# Patient Record
Sex: MALE | Race: WHITE | NOT HISPANIC OR LATINO | Employment: FULL TIME | URBAN - METROPOLITAN AREA
[De-identification: names, ages, dates, MRNs, and addresses within clinical notes are randomized per-mention and may not be internally consistent; named-entity substitution may affect disease eponyms.]

---

## 2017-01-23 ENCOUNTER — ALLSCRIPTS OFFICE VISIT (OUTPATIENT)
Dept: OTHER | Facility: OTHER | Age: 47
End: 2017-01-23

## 2017-01-24 LAB
BUN SERPL-MCNC: 22 MG/DL (ref 6–24)
BUN/CREA RATIO (HISTORICAL): 17 (ref 9–20)
CALCIUM SERPL-MCNC: 9.8 MG/DL (ref 8.7–10.2)
CHLORIDE SERPL-SCNC: 105 MMOL/L (ref 96–106)
CO2 SERPL-SCNC: 24 MMOL/L (ref 18–29)
CREAT SERPL-MCNC: 1.27 MG/DL (ref 0.76–1.27)
EGFR AFRICAN AMERICAN (HISTORICAL): 77 ML/MIN/1.73
EGFR-AMERICAN CALC (HISTORICAL): 66 ML/MIN/1.73
GLUCOSE SERPL-MCNC: 96 MG/DL (ref 65–99)
POTASSIUM SERPL-SCNC: 4.2 MMOL/L (ref 3.5–5.2)
SODIUM SERPL-SCNC: 146 MMOL/L (ref 134–144)
SODIUM URINE (HISTORICAL): 71 MMOL/L

## 2017-01-25 LAB
OSMOLALITY UR: 480 MOSMOL/KG
OSMOLALITY, SERUM (HISTORICAL): 303 MOSMOL/KG (ref 275–295)

## 2017-02-03 DIAGNOSIS — R20.0 ANESTHESIA OF SKIN: ICD-10-CM

## 2017-02-03 DIAGNOSIS — R29.898 OTHER SYMPTOMS AND SIGNS INVOLVING THE MUSCULOSKELETAL SYSTEM: ICD-10-CM

## 2017-02-14 ENCOUNTER — HOSPITAL ENCOUNTER (OUTPATIENT)
Dept: MRI IMAGING | Facility: HOSPITAL | Age: 47
Discharge: HOME | End: 2017-02-14
Payer: COMMERCIAL

## 2017-02-14 DIAGNOSIS — R20.0 ANESTHESIA OF SKIN: ICD-10-CM

## 2017-02-14 DIAGNOSIS — R29.898 OTHER SYMPTOMS AND SIGNS INVOLVING THE MUSCULOSKELETAL SYSTEM: ICD-10-CM

## 2017-02-14 PROCEDURE — A9585 GADOBUTROL INJECTION: HCPCS | Performed by: RADIOLOGY

## 2017-02-14 PROCEDURE — 70553 MRI BRAIN STEM W/O & W/DYE: CPT

## 2017-02-14 RX ADMIN — GADOBUTROL 10 ML: 604.72 INJECTION INTRAVENOUS at 21:08

## 2017-02-16 ENCOUNTER — GENERIC CONVERSION - ENCOUNTER (OUTPATIENT)
Dept: OTHER | Facility: OTHER | Age: 47
End: 2017-02-16

## 2017-03-06 ENCOUNTER — GENERIC CONVERSION - ENCOUNTER (OUTPATIENT)
Dept: OTHER | Facility: OTHER | Age: 47
End: 2017-03-06

## 2017-03-06 ENCOUNTER — TRANSCRIBE ORDERS (OUTPATIENT)
Dept: ADMINISTRATIVE | Facility: HOSPITAL | Age: 47
End: 2017-03-06

## 2017-03-06 ENCOUNTER — ALLSCRIPTS OFFICE VISIT (OUTPATIENT)
Dept: OTHER | Facility: OTHER | Age: 47
End: 2017-03-06

## 2017-03-06 DIAGNOSIS — M47.22 CERVICAL SPONDYLOSIS WITH RADICULOPATHY: Primary | ICD-10-CM

## 2017-03-10 DIAGNOSIS — M47.22 OTHER SPONDYLOSIS WITH RADICULOPATHY, CERVICAL REGION: ICD-10-CM

## 2017-03-25 ENCOUNTER — HOSPITAL ENCOUNTER (OUTPATIENT)
Dept: MRI IMAGING | Facility: HOSPITAL | Age: 47
Discharge: HOME | End: 2017-03-25
Attending: PSYCHIATRY & NEUROLOGY
Payer: COMMERCIAL

## 2017-03-25 DIAGNOSIS — M47.22 OTHER SPONDYLOSIS WITH RADICULOPATHY, CERVICAL REGION: ICD-10-CM

## 2017-03-25 PROCEDURE — 72141 MRI NECK SPINE W/O DYE: CPT

## 2017-03-27 ENCOUNTER — HOSPITAL ENCOUNTER (OUTPATIENT)
Dept: NEUROLOGY | Facility: CLINIC | Age: 47
Discharge: HOME | End: 2017-03-27
Payer: COMMERCIAL

## 2017-03-27 ENCOUNTER — GENERIC CONVERSION - ENCOUNTER (OUTPATIENT)
Dept: OTHER | Facility: OTHER | Age: 47
End: 2017-03-27

## 2017-03-27 DIAGNOSIS — M47.22 CERVICAL SPONDYLOSIS WITH RADICULOPATHY: ICD-10-CM

## 2017-03-27 DIAGNOSIS — R20.2 PARESTHESIA: ICD-10-CM

## 2017-03-27 PROCEDURE — 95913 NRV CNDJ TEST 13/> STUDIES: CPT

## 2017-03-27 PROCEDURE — 95886 MUSC TEST DONE W/N TEST COMP: CPT

## 2017-03-30 ENCOUNTER — ALLSCRIPTS OFFICE VISIT (OUTPATIENT)
Dept: OTHER | Facility: OTHER | Age: 47
End: 2017-03-30

## 2017-03-30 LAB
BILIRUB UR QL STRIP: NORMAL
CLARITY UR: NORMAL
COLOR UR: YELLOW
GLUCOSE (HISTORICAL): NORMAL
HGB UR QL STRIP.AUTO: NORMAL
KETONES UR STRIP-MCNC: NORMAL MG/DL
LEUKOCYTE ESTERASE UR QL STRIP: NORMAL
NITRITE UR QL STRIP: NORMAL
PH UR STRIP.AUTO: 6.5 [PH]
PROT UR STRIP-MCNC: NORMAL MG/DL
SP GR UR STRIP.AUTO: 1.02
UROBILINOGEN UR QL STRIP.AUTO: 0.2

## 2017-04-17 ENCOUNTER — ALLSCRIPTS OFFICE VISIT (OUTPATIENT)
Dept: OTHER | Facility: OTHER | Age: 47
End: 2017-04-17

## 2017-05-23 ENCOUNTER — GENERIC CONVERSION - ENCOUNTER (OUTPATIENT)
Dept: OTHER | Facility: OTHER | Age: 47
End: 2017-05-23

## 2018-01-10 NOTE — RESULT NOTES
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, limited mobility     COMPARISON: Left shoulder pain     VIEWS: 4; 4 images     FINDINGS:     There is no acute fracture or dislocation  There is narrowing of the acromioclavicular joint consistent with degenerative change  Humeral head is mildly high riding which may indicate chronic rotator cuff injury  No lytic or blastic lesions are seen  Soft tissues are unremarkable  IMPRESSION:     No acute osseous abnormality  Workstation performed: [id number]     Signed by:   Susan Sandoval MD   3/25/16       Plan  Chronic left shoulder pain, Injury of left shoulder, initial encounter    · * MRI SHOULDER LEFT WO CONTRAST; Status:Need Information - Financial  Authorization;  Requested for:25Mar2016;

## 2018-01-12 VITALS
OXYGEN SATURATION: 98 % | RESPIRATION RATE: 18 BRPM | SYSTOLIC BLOOD PRESSURE: 126 MMHG | BODY MASS INDEX: 33.84 KG/M2 | DIASTOLIC BLOOD PRESSURE: 78 MMHG | HEART RATE: 77 BPM | HEIGHT: 69 IN | WEIGHT: 228.5 LBS

## 2018-01-12 NOTE — RESULT NOTES
Verified Results  * MRI SHOULDER LEFT WO CONTRAST [id number] 06:23AM [facility] Order Number: [id number]     Test Name Result Flag Reference   MRI SHOULDER LEFT WO CONTRAST (Report)     MRI LEFT SHOULDER     INDICATION: Recent fall, left shoulder pain with decreased range of motion     COMPARISON: Left shoulder radiograph 3/24/2016     TECHNIQUE:  The following MR sequences were obtained of the left shoulder: Localizer, axial GRE/PD fat sat, oblique coronal T2 fat sat, oblique sagittal T1/T2 fat sat  Images were acquired on a 1 5 Kristi unit  Gadolinium was not used  FINDINGS:     SUBCUTANEOUS TISSUES: Normal     JOINT EFFUSION: None  ACROMION PROCESS: Normal      ROTATOR CUFF: Supraspinatus and infraspinatus insertional tendinosis without evidence of full-thickness component rotator cuff tear  The remaining muscles and tendons of the rotator cuff appear intact without muscle atrophy or fatty infiltration  SUBACROMIAL/SUBDELTOID BURSA: Trace fluid in the subacromial/subdeltoid bursa  LONG HEAD OF BICEPS TENDON: There is mild tendinosis without tear  GLENOID LABRUM: Intact  GLENOHUMERAL JOINT: Intact  ACROMIOCLAVICULAR JOINT: There is moderate osteoarthritis  BONE MARROW SIGNAL: There is a small intraosseous cyst in the lateral humeral head  No osseous edema to suggest fracture or contusion  IMPRESSION:   1  Supraspinatus and infraspinatus insertional tendinosis without evidence of full-thickness component rotator cuff tear or tendon retraction  2  Mild biceps tendinosis  3  Moderate acromioclavicular joint degenerative changes         Workstation performed: [id number]     Signed by:   Michael Nation MD   3/30/16

## 2018-01-14 VITALS
WEIGHT: 230 LBS | RESPIRATION RATE: 18 BRPM | HEART RATE: 78 BPM | BODY MASS INDEX: 34.07 KG/M2 | SYSTOLIC BLOOD PRESSURE: 120 MMHG | HEIGHT: 69 IN | DIASTOLIC BLOOD PRESSURE: 78 MMHG

## 2018-01-14 NOTE — PROGRESS NOTES
Assessment    1  Well adult exam (V70 0) (Z00 00)    Plan  Encounter for screening for cardiovascular disorders, Well adult exam    · ([facility]) Lipid Panel With LDL/HDL Ratio; Status:Active; Requested for:41Qti9511;   Well adult exam    · ([facility]) CMP14+eGFR; Status:Active; Requested for:45Uyq5337;     Discussion/Summary  Impression: health maintenance visit  Currently, he eats a healthy diet and has an adequate exercise regimen  Prostate cancer screening: PSA is not indicated  Colorectal cancer screening: colorectal cancer screening is not indicated  Screening lab work includes glucose and lipid profile  Patient is a 47-year-old male here for wellness visit  His review of systems is negative  Exam was normal  He was given a prescription for fasting blood work and when I receive that I will fill out his form for his insurance  Chief Complaint  Patient here for insurance physical; no recent labs  History of Present Illness  HM, Adult Male: The patient is being seen for a health maintenance evaluation  The last health maintenance visit was 2 year(s) ago  General Health: The patient's health since the last visit is described as good  He has regular dental visits  He complains of vision problems  He denies hearing loss  Lifestyle:  He consumes a diverse and healthy diet  He exercises regularly  He does not use tobacco  He consumes alcohol  He reports occasional alcohol use  He denies drug use  weight lifting  [place]  Reproductive health:  the patient is sexually active  Screening:       HPI: Patient is here for well visit  Review of Systems    Constitutional: No fever or chills, feels well, no tiredness, no recent weight gain or weight loss  ENT: no complaints of earache, no hearing loss, no nosebleeds, no nasal discharge, no sore throat, no hoarseness     Cardiovascular: No complaints of slow heart rate, no fast heart rate, no chest pain, no palpitations, no leg claudication, no lower extremity  Respiratory: No complaints of shortness of breath, no wheezing, no cough, no SOB on exertion, no orthopnea or PND  Gastrointestinal: No complaints of abdominal pain, no constipation, no nausea or vomiting, no diarrhea or bloody stools  Genitourinary: No complaints of dysuria, no incontinence, no hesitancy, no nocturia, no genital lesion, no testicular pain  Musculoskeletal: arthralgias  Neurological: No compliants of headache, no confusion, no convulsions, no numbness or tingling, no dizziness or fainting, no limb weakness, no difficulty walking  Active Problems    1  Arthritis of left acromioclavicular joint (716 91) (M19 90)   2  Biceps tendinitis of left shoulder (726 12) (M75 22)   3  Chronic left shoulder pain (719 41,338 29) (M25 512,G89 29)   4  Depression (311) (F32 9)   5  Injury of left shoulder, initial encounter (959 2) (S49 92XA)   6   Tendinitis of left rotator cuff (726 10) (M75 82)    Past Medical History    · History of Acute sinusitis (461 9) (J01 90)   · History of Acute upper respiratory infection (465 9) (J06 9)   · History of Acute upper respiratory infection (465 9) (J06 9)   · History of Chest tightness or pressure (786 59) (R07 89)   · History of acute sinusitis (V12 69) (Z87 09)   · History of diarrhea (V12 79) (P44 639)   · History of diarrhea (V12 79) (O30 428)   · History of dizziness (V13 89) (W63 604)   · History of fatigue (V13 89) (U20 301)   · History of fatigue (V13 89) (R00 202)   · History of gastroenteritis (V12 79) (Z87 19)   · History of headache (V13 89) (B91 522)   · History of low back pain (V13 59) (Z87 39)   · History of viral gastroenteritis (V12 09) (Z86 19)   · History of viral infection (V12 09) (Z86 19)   · History of Impacted cerumen, unspecified laterality (380 4) (H61 20)   · History of Laceration of scalp, subsequent encounter (V58 89,873 0) (S01 01XD)   · History of Myalgia and myositis (729 1)   · History of Need for influenza vaccination (V04 81) (Z23)   · History of Palpitations (785 1) (R00 2)   · History of Post-nasal drip (784 91) (R09 82)   · History of Sore throat (462) (J02 9)    Surgical History    · History of Lumbar Vertebral Fusion   · History of Nasal Septal Deviation Repair    Family History  Mother    · Family history of myocardial infarction (V17 3) (Z82 49)   · Family history of osteoporosis (V17 81) (Z80 61)  Father    · Family history of    · Family history of lung cancer (V16 1) (Z80 1)  Sister    · Family history of fibromyalgia (V17 89) (Z82 69)  Multiple Family Members    · Family history of     Social History    · Never A Smoker   · Occasional alcohol use    Current Meds   1  Escitalopram Oxalate 10 MG Oral Tablet; Take 1 and 1/2 tablets by  mouth daily; Therapy: [id number] to (Evaluate:78Mql9872)  Requested for: 56NLO3282; Last   Rx:86Wcq3109 Ordered    Allergies    1  No Known Drug Allergies    Vitals   Recorded: 07Zya3917 06:48PM Recorded: 43JLC0678 46:82OK   Systolic 946 567   Diastolic 82 88   Heart Rate  90   Temperature  98 5 F   O2 Saturation  97, RA   Height  5 ft 9 in   Weight  226 lb    BMI Calculated  33 37   BSA Calculated  2 17     Physical Exam    Constitutional   General appearance: No acute distress, well appearing and well nourished  Eyes   Conjunctiva and lids: No erythema, swelling or discharge  Pupils and irises: Equal, round, reactive to light  Ears, Nose, Mouth, and Throat   External inspection of ears and nose: Normal     Otoscopic examination: Tympanic membranes translucent with normal light reflex  Canals patent without erythema  Oropharynx: Normal with no erythema, edema, exudate or lesions  Neck   Neck: Supple, symmetric, trachea midline, no masses  Thyroid: Normal, no thyromegaly  Pulmonary   Respiratory effort: No increased work of breathing or signs of respiratory distress  Auscultation of lungs: Clear to auscultation      Cardiovascular Auscultation of heart: Normal rate and rhythm, normal S1 and S2, no murmurs  Carotid pulses: 2+ bilaterally  Pedal pulses: 2+ bilaterally  Examination of extremities for edema and/or varicosities: Normal     Abdomen   Abdomen: Non-tender, no masses  Lymphatic   Palpation of lymph nodes in neck: No lymphadenopathy  Musculoskeletal   Gait and station: Normal     Muscle strength/tone: Normal     Skin   Skin and subcutaneous tissue: Normal without rashes or lesions  Neurologic   Reflexes: 2+ and symmetric      Psychiatric   Orientation to person, place and time: Normal     Mood and affect: Normal        Signatures   Electronically signed by : Colletta Goring, DO; Sep 16 2016  4:58AM EST                       (Author)

## 2018-01-15 VITALS
HEIGHT: 69 IN | BODY MASS INDEX: 34.86 KG/M2 | RESPIRATION RATE: 16 BRPM | SYSTOLIC BLOOD PRESSURE: 122 MMHG | OXYGEN SATURATION: 98 % | HEART RATE: 88 BPM | TEMPERATURE: 98.1 F | DIASTOLIC BLOOD PRESSURE: 80 MMHG | WEIGHT: 235.38 LBS

## 2018-01-15 NOTE — RESULT NOTES
Verified Results  EMG TWO EXTREMITIES WITH OR W/O RELATED PARASPINAL AREAS [id number] 12:00AM Nannette Almonte   Patient has left-sided upper and lower extremities symptoms, requiring EMG/NCS at [facility]       Test Name Result Flag Reference   EMG TWO EXTREMITIES LUE/LLE

## 2018-01-17 NOTE — RESULT NOTES
Verified Results  * MRI CERVICAL SPINE [address] [id number] 08:40AM Mayte Rivers Order Number: [id number]    - Patient Instructions: To schedule this appointment, please contact Central Scheduling at [phone]  Test Name Result Flag Reference   MRI CERVICAL SPINE WO CONTRAST (Report)     MRI CERVICAL SPINE WITHOUT CONTRAST     INDICATION: 721 0 m47 22 History: numbness/weakness lt side of body x several months      COMPARISON: None  TECHNIQUE: Sagittal T1, sagittal T2, sagittal inversion recovery, axial T2, axial 2D merge        IMAGE QUALITY: Diagnostic     FINDINGS:     ALIGNMENT: Normal alignment of the cervical spine  No compression fracture  No subluxation  No scoliosis  MARROW SIGNAL: Scattered degenerative endplate changes  No focally suspicious marrow lesions  No bone marrow edema or compression abnormality  CERVICAL AND VISUALIZED THORACIC CORD: Normal signal within the visualized cord  PREVERTEBRAL AND PARASPINAL SOFT TISSUES: Prevertebral and paraspinal soft tissues are unremarkable  VISUALIZED POSTERIOR FOSSA: The visualized posterior fossa demonstrates no abnormal signal      CERVICAL DISC SPACES:        C2-C3: Normal      C3-C4: Small central disc herniation, protrusion type  No significant central canal or neural foraminal narrowing  C4-C5: Normal      C5-C6: There is a disc osteophyte complex with a superimposed left neural foraminal disc protrusion  Severe left neural foraminal narrowing  Mild central canal narrowing  Mild right neural foraminal narrowing  C6-C7: There is a right paracentral disc herniation, protrusion type  There is mild central canal narrowing  There is moderate to severe right neural foraminal narrowing  There is mild left neural foraminal narrowing       C7-T1: Normal      UPPER THORACIC DISC SPACES: Normal        IMPRESSION:     Scattered multilevel spondylotic changes most pronounced on the left at C5-C6       Workstation performed: [id number]     Signed by:    Radha Jacobs MD   3/27/17

## 2018-03-16 ENCOUNTER — TRANSCRIBE ORDERS (OUTPATIENT)
Dept: ADMINISTRATIVE | Facility: HOSPITAL | Age: 48
End: 2018-03-16

## 2018-03-16 DIAGNOSIS — G47.33 OBSTRUCTIVE SLEEP APNEA SYNDROME: Primary | ICD-10-CM

## 2018-05-15 ENCOUNTER — OFFICE VISIT (OUTPATIENT)
Dept: SLEEP CENTER | Facility: CLINIC | Age: 48
End: 2018-05-15
Payer: COMMERCIAL

## 2018-05-15 VITALS
BODY MASS INDEX: 33.41 KG/M2 | DIASTOLIC BLOOD PRESSURE: 78 MMHG | WEIGHT: 233.4 LBS | HEART RATE: 74 BPM | SYSTOLIC BLOOD PRESSURE: 118 MMHG | HEIGHT: 70 IN

## 2018-05-15 DIAGNOSIS — G47.33 OBSTRUCTIVE SLEEP APNEA SYNDROME: ICD-10-CM

## 2018-05-15 PROCEDURE — 99204 OFFICE O/P NEW MOD 45 MIN: CPT | Performed by: INTERNAL MEDICINE

## 2018-05-15 RX ORDER — ESCITALOPRAM OXALATE 20 MG/1
TABLET ORAL
Refills: 3 | COMMUNITY
Start: 2018-04-29

## 2018-05-15 RX ORDER — FEXOFENADINE HCL 180 MG/1
1 TABLET ORAL 2 TIMES DAILY
COMMUNITY

## 2018-05-15 NOTE — PROGRESS NOTES
Consultation - Sleep Center   Juarez Bowling : 1970  MRN: [id number]      Assessment:  The patient has severe obstructive sleep apnea and is to intolerant of CPAP  He is interested in exploring the Yesi device  I will refer him to an ENT, Dr Mone Michael at the [address] [place] [address]  Plan:  Refer for Inspire device    Follow up:  6 months    History of Present Illness:   48 y o male with reported history of severe obstructive sleep apnea diagnosed on home sleep testing last year has had difficulty with using his positive airway pressure device  Results of his testing are not available  It is presumed that he is on APAP, since he did not undergo a titration study  PAP has helped his severe daytime sleepiness      Review of Systems:        Genitourinary need to urinate more than twice a night   Cardiology none   Gastrointestinal none   Neurology awaken with headache, muscle weakness and numbness/tingling of an extremity   Constitutional fatigue   Integumentary none   Psychiatry anxiety and depression   Musculoskeletal joint pain, muscle aches and back pain   Pulmonary none   ENT none   Endocrine none   Hematological none           Historical Information    Past Medical History:  The patient is treated for anxiety and allergies    Past Surgical History:  None reported    Family History: non-contributory      Sleep Schedule: unremarkable    Snoring:    Yes, prior to use of positive airway pressure      Witnessed Apnea:    Yes, prior to use of positive airway pressure    Medications/Allergies:    Current Outpatient Prescriptions:     escitalopram (LEXAPRO) 20 mg tablet, , Disp: , Rfl: 3    fexofenadine (ALLEGRA ALLERGY) 180 MG tablet, Take 1 capsule by mouth 2 (two) times a day, Disp: , Rfl:         No notes on file                  Objective:    Vital Signs:   Vitals:    05/15/18 1000   BP: 118/78   Pulse: 74   Weight: 106 kg (233 lb 6 4 oz)   Height: 5' 10" (1 778 m) Neck Circumference: 42      [place] Sleepiness Scale: Total score: 6    Physical Exam:    General: Alert, appropriate, cooperative, overweight    Head: NC/AT, no retrognathia    Nose: No septal deviation, nares mildly obstructed, mucosa normal    Throat: Airway lumen narrowed/crowded, uvula slightly enlarged, tongue base thickened    Extremity: No clubbing, cyanosis    Skin: Warm, dry    Neuro: No motor abnormalities, cranial nerves appear intact    Sleep Study Results:   Unavailable, will review when available  PAP Pressure: Unknown  DME Provider:  Unknown    Counseling / Coordination of Care  Total clinic time spent today 25 minutes  Greater than 50% of total time was spent with the patient and / or family counseling and / or coordination of care  A description of the counseling / coordination of care: We discussed the pathophysiology of obstructive sleep apnea as well as the possible treatment options  We also discussed the rationale for positive airway pressure therapy  We also discussed the limited experience with the [facility]  ANTONIO Lin    Board Certified Sleep Specialist